# Patient Record
Sex: MALE
[De-identification: names, ages, dates, MRNs, and addresses within clinical notes are randomized per-mention and may not be internally consistent; named-entity substitution may affect disease eponyms.]

---

## 2024-07-09 ENCOUNTER — HOSPITAL ENCOUNTER (OUTPATIENT)
Facility: HOSPITAL | Age: 24
Setting detail: SPECIMEN
Discharge: HOME | End: 2024-07-12

## 2024-07-09 PROCEDURE — 80177 DRUG SCRN QUAN LEVETIRACETAM: CPT

## 2024-07-09 PROCEDURE — 36415 COLL VENOUS BLD VENIPUNCTURE: CPT

## 2024-07-11 LAB — LEVETIRACETAM SERPL-MCNC: <2 UG/ML (ref 10–40)

## 2024-08-27 ENCOUNTER — TRANSCRIBE ORDERS (OUTPATIENT)
Facility: HOSPITAL | Age: 24
End: 2024-08-27

## 2024-08-27 DIAGNOSIS — G40.909 SEIZURE DISORDER (HCC): Primary | ICD-10-CM

## 2024-09-18 ENCOUNTER — HOSPITAL ENCOUNTER (OUTPATIENT)
Facility: HOSPITAL | Age: 24
Discharge: HOME | End: 2024-09-21

## 2024-09-18 DIAGNOSIS — G40.909 SEIZURE DISORDER (HCC): ICD-10-CM

## 2024-09-18 PROCEDURE — 95714 VEEG EA 12-26 HR UNMNTR: CPT

## 2025-02-07 PROBLEM — G40.909 SEIZURE DISORDER (HCC): Status: ACTIVE | Noted: 2025-02-07

## 2025-04-03 ENCOUNTER — TELEPHONE (OUTPATIENT)
Age: 25
End: 2025-04-03

## 2025-04-03 NOTE — TELEPHONE ENCOUNTER
Second phone call placed to [facility], spoke with Lonnie Robert, inquiring if patient was advised to schedule a follow up appointment with Dr. Katz. Lonnie advised, patient had not brought in updated documents to be eligible for coverage until yesterday. I will reach out to [facility] now to set up the appointment once these documents are processed and patient is approved for coverage again. JL

## 2025-04-03 NOTE — TELEPHONE ENCOUNTER
----- Message from Dr. Jose Manuel Katz MD sent at 2/7/2025  1:40 PM EST -----  I read an EEG on this patient that was completed a few months ago and it was abnormal.  Please let [facility] know and patient should schedule a visit with us